# Patient Record
Sex: MALE | Race: ASIAN | Employment: STUDENT | URBAN - METROPOLITAN AREA
[De-identification: names, ages, dates, MRNs, and addresses within clinical notes are randomized per-mention and may not be internally consistent; named-entity substitution may affect disease eponyms.]

---

## 2023-01-19 ENCOUNTER — HOSPITAL ENCOUNTER (OUTPATIENT)
Age: 23
Discharge: HOME | End: 2023-01-19
Payer: COMMERCIAL

## 2023-01-19 VITALS
RESPIRATION RATE: 16 BRPM | OXYGEN SATURATION: 100 % | HEART RATE: 64 BPM | TEMPERATURE: 98 F | BODY MASS INDEX: 26.82 KG/M2 | WEIGHT: 187.38 LBS | SYSTOLIC BLOOD PRESSURE: 129 MMHG | HEIGHT: 70 IN | DIASTOLIC BLOOD PRESSURE: 84 MMHG

## 2023-01-19 DIAGNOSIS — L73.9 FOLLICULITIS: Primary | ICD-10-CM

## 2023-01-19 PROCEDURE — 99203 OFFICE O/P NEW LOW 30 MIN: CPT

## 2023-01-19 RX ORDER — DOXYCYCLINE HYCLATE 100 MG/1
100 CAPSULE ORAL 2 TIMES DAILY
Qty: 14 CAPSULE | Refills: 0 | Status: SHIPPED | OUTPATIENT
Start: 2023-01-19 | End: 2023-01-26

## 2023-01-19 RX ORDER — PREDNISONE 20 MG/1
40 TABLET ORAL DAILY
Qty: 10 TABLET | Refills: 0 | Status: SHIPPED | OUTPATIENT
Start: 2023-01-19 | End: 2023-01-24

## 2023-01-20 NOTE — DISCHARGE INSTRUCTIONS
Rest and drink plenty of fluids. Take Zyrtec or Claritin twice a day to help with itching. Start the doxycycline and make sure to finish the entire prescription. Start the prednisone tomorrow and make sure to take it with food. Follow up with Dr. Elliot Rolle in 1-2 weeks as needed.